# Patient Record
(demographics unavailable — no encounter records)

---

## 2024-11-13 NOTE — HEALTH RISK ASSESSMENT
[No] : No [1 or 2 (0 pts)] : 1 or 2 (0 points) [Never (0 pts)] : Never (0 points) [No falls in past year] : Patient reported no falls in the past year [0] : 2) Feeling down, depressed, or hopeless: Not at all (0) [Never] : Never [PHQ-2 Negative - No further assessment needed] : PHQ-2 Negative - No further assessment needed [Audit-CScore] : 0 [[id number]] : 0

## 2024-11-13 NOTE — HISTORY OF PRESENT ILLNESS
[FreeTextEntry1] : follow visit. pt was yesterday in urgent care because she was feeling pressure in her head and weakness but pt feel the same today. [de-identified] : Ms. BRADLEY SAGE is a 57 year old female here today for follow-up from urgent care visit.  [place] pressure in her head and lightheadedness at work yesterday. In the UC her BP was elevated.  Today she feels some of the sxs she felt yesterday. Patient says she has been under a fair amount of stress.